# Patient Record
Sex: MALE | Race: WHITE | NOT HISPANIC OR LATINO | URBAN - METROPOLITAN AREA
[De-identification: names, ages, dates, MRNs, and addresses within clinical notes are randomized per-mention and may not be internally consistent; named-entity substitution may affect disease eponyms.]

---

## 2020-07-01 ENCOUNTER — OUT OF OFFICE VISIT (OUTPATIENT)
Dept: URBAN - METROPOLITAN AREA MEDICAL CENTER 1 | Facility: MEDICAL CENTER | Age: 35
End: 2020-07-01

## 2020-07-01 DIAGNOSIS — K52.89 (LYMPHOCYTIC) MICROSCOPIC COLITIS: ICD-10-CM

## 2020-07-01 DIAGNOSIS — K75.89 CHOLESTATIC HEPATITIS: ICD-10-CM

## 2020-07-01 DIAGNOSIS — K31.89 ACQUIRED DEFORMITY OF PYLORUS: ICD-10-CM

## 2020-07-01 DIAGNOSIS — K20.8 CORROSIVE ESOPHAGITIS: ICD-10-CM

## 2020-07-01 DIAGNOSIS — K92.0 BLOODY EMESIS: ICD-10-CM

## 2020-07-01 PROCEDURE — 99255 IP/OBS CONSLTJ NEW/EST HI 80: CPT | Performed by: INTERNAL MEDICINE

## 2020-07-01 PROCEDURE — G0500 MOD SEDAT ENDO SERVICE >5YRS: HCPCS | Performed by: INTERNAL MEDICINE

## 2020-07-01 PROCEDURE — 43239 EGD BIOPSY SINGLE/MULTIPLE: CPT | Performed by: INTERNAL MEDICINE

## 2020-07-06 ENCOUNTER — TELEPHONE ENCOUNTER (OUTPATIENT)
Dept: URBAN - METROPOLITAN AREA CLINIC 92 | Facility: CLINIC | Age: 35
End: 2020-07-06

## 2020-07-10 LAB
HEMATOCRIT: 38.2
HEMOGLOBIN: 12.5
MCH: 29.9
MCHC: 32.7
MCV: 91
NRBC: (no result)
PLATELETS: 376
QUANTIFERON CRITERIA: (no result)
QUANTIFERON INCUBATION: (no result)
QUANTIFERON MITOGEN VALUE: 0.03
QUANTIFERON NIL VALUE: 0.02
QUANTIFERON TB1 AG VALUE: 0.03
QUANTIFERON TB2 AG VALUE: 0.02
QUANTIFERON-TB GOLD PLUS: (no result)
RBC: 4.18
RDW: 13
WBC: 21.4

## 2020-07-21 ENCOUNTER — OFFICE VISIT (OUTPATIENT)
Dept: URBAN - NONMETROPOLITAN AREA CLINIC 2 | Facility: CLINIC | Age: 35
End: 2020-07-21

## 2020-08-06 ENCOUNTER — OFFICE VISIT (OUTPATIENT)
Dept: URBAN - METROPOLITAN AREA TELEHEALTH 2 | Facility: TELEHEALTH | Age: 35
End: 2020-08-06

## 2020-08-06 DIAGNOSIS — R10.32 LEFT LOWER QUADRANT ABDOMINAL PAIN: ICD-10-CM

## 2020-08-06 DIAGNOSIS — N17.9 AKI (ACUTE KIDNEY INJURY): ICD-10-CM

## 2020-08-06 DIAGNOSIS — K50.018 TERMINAL ILEITIS WITH OTHER COMPLICATION: ICD-10-CM

## 2020-08-06 DIAGNOSIS — K50.80 CROHN'S DISEASE OF BOTH SMALL AND LARGE INTESTINE: ICD-10-CM

## 2020-08-06 DIAGNOSIS — R74.0 ELEVATED TRANSAMINASE LEVEL: ICD-10-CM

## 2020-08-06 PROCEDURE — G9903 PT SCRN TBCO ID AS NON USER: HCPCS | Performed by: NURSE PRACTITIONER

## 2020-08-06 PROCEDURE — G8427 DOCREV CUR MEDS BY ELIG CLIN: HCPCS | Performed by: NURSE PRACTITIONER

## 2020-08-06 PROCEDURE — G8420 CALC BMI NORM PARAMETERS: HCPCS | Performed by: NURSE PRACTITIONER

## 2020-08-06 PROCEDURE — 99213 OFFICE O/P EST LOW 20 MIN: CPT | Performed by: NURSE PRACTITIONER

## 2020-08-06 PROCEDURE — 1036F TOBACCO NON-USER: CPT | Performed by: NURSE PRACTITIONER

## 2020-08-06 RX ORDER — METRONIDAZOLE 500 MG/1
1 TABLET TABLET, FILM COATED ORAL THREE TIMES A DAY
Qty: 30 TABLET | Refills: 0 | OUTPATIENT
Start: 2020-08-06 | End: 2020-08-16

## 2020-08-06 RX ORDER — CITALOPRAM HYDROBROMIDE 20 MG/1
1 TABLET TABLET, FILM COATED ORAL ONCE A DAY
Status: ACTIVE | COMMUNITY

## 2020-08-06 RX ORDER — LISINOPRIL 20 MG/1
1 TABLET TABLET ORAL ONCE A DAY
Status: ACTIVE | COMMUNITY

## 2020-08-06 RX ORDER — CIPROFLOXACIN HCL 500 MG
1 TABLET TABLET ORAL
Qty: 20 TABLET | Refills: 0 | OUTPATIENT
Start: 2020-08-06 | End: 2020-08-16

## 2020-08-06 RX ORDER — PREDNISONE 10 MG/1
1 TABLET TABLET ORAL ONCE A DAY
Status: ACTIVE | COMMUNITY

## 2020-08-06 RX ORDER — DICYCLOMINE HYDROCHLORIDE 10 MG/1
1 TABLET CAPSULE ORAL THREE TIMES A DAY
Qty: 270 TABLET | Refills: 3 | OUTPATIENT
Start: 2020-08-06 | End: 2021-08-01

## 2020-08-06 NOTE — HPI-TODAY'S VISIT:
Felipe is a  34 year old male who with suspected Crohns who presents for hospital follow up after admission in July to [facility] for severe abdominal pain, nausea, coffee ground emesis and bloody diarrhea.  He was noted to have significantly elevated transaminases  and ALT  greater than 500 with normal bili, alk phos, GGT, and elevated CK 1266. He had KACEY with creatinine of 3.34. CT abdomen and pelvis with terminal ileitis with air and fluid distention of the proximal small bowel. EGD and colonoscopy moderate esophagitis and gastritis, normal duodenum, patchy inflammation and ulceration throughout colon with, most active inflammtion in the ascending colon and rectum, and ulcerated polypoid "mass" in the TI. Gastric bx negative for H. pylori, esophageal bx negative for Barretts, colonic and TI bx c/w nonspecific ulceration and acute inflammation. C. diff was negative. Hepatitis and chronic liver serologies were negative. KACEY resolved with hydration and LFTs began trending down. He improved with IV steroids and antibiotics and was discharged to complete antibiotics, steroid taper, and follow up in clinic.  He missed his initial follow up appt. He currently does not have insurance due to furlough. Today he reports worsening abdominal pain and many days he is unable to get out of bed. This is a crampy LLQ pain that radiates to his rectum. He has nausea/vomiting when the pain is severe. He has had fever of 101 and 102, last was about 5 days ago. He has chills/rigors with this. He has arthalgias of his ankles and knees. Diarrhea has improved and he is not having 2-3 stools daily that are mushy. Urgency is improving. He continues with nocturnal awakening for BM 1-2 times a night. Denies bleeding. He has completed the antibiotics and has been taking Prednisone 40mg daily since his discharge on 7/4/20. CBC after d/c with WBC of 21, likely from steroids. His Prometheus panel was not c/w IBD and his TB quant gold is indeterminate. TG

## 2020-08-10 ENCOUNTER — TELEPHONE ENCOUNTER (OUTPATIENT)
Dept: URBAN - NONMETROPOLITAN AREA CLINIC 2 | Facility: CLINIC | Age: 35
End: 2020-08-10

## 2020-08-14 ENCOUNTER — OUT OF OFFICE VISIT (OUTPATIENT)
Dept: URBAN - METROPOLITAN AREA MEDICAL CENTER 1 | Facility: MEDICAL CENTER | Age: 35
End: 2020-08-14

## 2020-08-14 DIAGNOSIS — R93.3 ABN FINDINGS-GI TRACT: ICD-10-CM

## 2020-08-14 DIAGNOSIS — R10.31 ABDOMINAL DISCOMFORT IN RIGHT LOWER QUADRANT: ICD-10-CM

## 2020-08-14 DIAGNOSIS — N17.8 OTHER ACUTE KIDNEY FAILURE: ICD-10-CM

## 2020-08-14 DIAGNOSIS — E86.0 DEHYDRATION: ICD-10-CM

## 2020-08-14 PROCEDURE — 99254 IP/OBS CNSLTJ NEW/EST MOD 60: CPT | Performed by: INTERNAL MEDICINE

## 2020-08-14 PROCEDURE — 99232 SBSQ HOSP IP/OBS MODERATE 35: CPT | Performed by: INTERNAL MEDICINE

## 2020-08-14 PROCEDURE — G8427 DOCREV CUR MEDS BY ELIG CLIN: HCPCS | Performed by: INTERNAL MEDICINE

## 2020-08-14 PROCEDURE — 99222 1ST HOSP IP/OBS MODERATE 55: CPT | Performed by: INTERNAL MEDICINE

## 2020-08-15 ENCOUNTER — OUT OF OFFICE VISIT (OUTPATIENT)
Dept: URBAN - METROPOLITAN AREA MEDICAL CENTER 1 | Facility: MEDICAL CENTER | Age: 35
End: 2020-08-15

## 2020-08-15 DIAGNOSIS — N17.8 OTHER ACUTE KIDNEY FAILURE: ICD-10-CM

## 2020-08-15 DIAGNOSIS — R11.2 ACUTE NAUSEA WITH NONBILIOUS VOMITING: ICD-10-CM

## 2020-08-15 DIAGNOSIS — R10.84 ABDOMINAL CRAMPING, GENERALIZED: ICD-10-CM

## 2020-08-15 DIAGNOSIS — R93.3 ABN FINDINGS-GI TRACT: ICD-10-CM

## 2020-08-15 PROCEDURE — 99232 SBSQ HOSP IP/OBS MODERATE 35: CPT | Performed by: INTERNAL MEDICINE

## 2020-08-18 ENCOUNTER — OUT OF OFFICE VISIT (OUTPATIENT)
Dept: URBAN - METROPOLITAN AREA MEDICAL CENTER 1 | Facility: MEDICAL CENTER | Age: 35
End: 2020-08-18

## 2020-08-18 DIAGNOSIS — R11.2 ACUTE NAUSEA WITH NONBILIOUS VOMITING: ICD-10-CM

## 2020-08-18 DIAGNOSIS — R10.84 ABDOMINAL CRAMPING, GENERALIZED: ICD-10-CM

## 2020-08-18 DIAGNOSIS — R93.3 ABN FINDINGS-GI TRACT: ICD-10-CM

## 2020-08-18 DIAGNOSIS — N17.8 OTHER ACUTE KIDNEY FAILURE: ICD-10-CM

## 2020-08-18 PROCEDURE — 99232 SBSQ HOSP IP/OBS MODERATE 35: CPT | Performed by: INTERNAL MEDICINE

## 2020-10-09 ENCOUNTER — OFFICE VISIT (OUTPATIENT)
Dept: URBAN - NONMETROPOLITAN AREA CLINIC 2 | Facility: CLINIC | Age: 35
End: 2020-10-09

## 2020-10-09 DIAGNOSIS — K50.018 TERMINAL ILEITIS WITH OTHER COMPLICATION: ICD-10-CM

## 2020-10-09 DIAGNOSIS — Z93.2 ILEOSTOMY IN PLACE: ICD-10-CM

## 2020-10-09 DIAGNOSIS — K50.00 CROHN'S DISEASE OF SMALL INTESTINE WITHOUT COMPLICATION: ICD-10-CM

## 2020-10-09 PROCEDURE — 1036F TOBACCO NON-USER: CPT | Performed by: NURSE PRACTITIONER

## 2020-10-09 PROCEDURE — G8427 DOCREV CUR MEDS BY ELIG CLIN: HCPCS | Performed by: NURSE PRACTITIONER

## 2020-10-09 PROCEDURE — 99213 OFFICE O/P EST LOW 20 MIN: CPT | Performed by: NURSE PRACTITIONER

## 2020-10-09 PROCEDURE — G8420 CALC BMI NORM PARAMETERS: HCPCS | Performed by: NURSE PRACTITIONER

## 2020-10-09 PROCEDURE — G8484 FLU IMMUNIZE NO ADMIN: HCPCS | Performed by: NURSE PRACTITIONER

## 2020-10-09 RX ORDER — PREDNISONE 10 MG/1
1 TABLET TABLET ORAL ONCE A DAY
Status: ACTIVE | COMMUNITY

## 2020-10-09 RX ORDER — CITALOPRAM HYDROBROMIDE 20 MG/1
1 TABLET TABLET, FILM COATED ORAL ONCE A DAY
Status: ACTIVE | COMMUNITY

## 2020-10-09 RX ORDER — DICYCLOMINE HYDROCHLORIDE 10 MG/1
1 TABLET CAPSULE ORAL THREE TIMES A DAY
Qty: 270 TABLET | Refills: 3 | Status: ACTIVE | COMMUNITY
Start: 2020-08-06 | End: 2021-08-01

## 2020-10-09 RX ORDER — LISINOPRIL 20 MG/1
1 TABLET TABLET ORAL ONCE A DAY
Status: ACTIVE | COMMUNITY

## 2020-10-09 NOTE — HPI-TODAY'S VISIT:
8/6/20 Felipe is a  34 year old male who with suspected Crohns who presents for hospital follow up after admission in July to [facility] for severe abdominal pain, nausea, coffee ground emesis and bloody diarrhea.  He was noted to have significantly elevated transaminases  and ALT  greater than 500 with normal bili, alk phos, GGT, and elevated CK 1266. He had KACEY with creatinine of 3.34. CT abdomen and pelvis with terminal ileitis with air and fluid distention of the proximal small bowel. EGD and colonoscopy moderate esophagitis and gastritis, normal duodenum, patchy inflammation and ulceration throughout colon with, most active inflammtion in the ascending colon and rectum, and ulcerated polypoid "mass" in the TI. Gastric bx negative for H. pylori, esophageal bx negative for Barretts, colonic and TI bx c/w nonspecific ulceration and acute inflammation. C. diff was negative. Hepatitis and chronic liver serologies were negative. KACEY resolved with hydration and LFTs began trending down. He improved with IV steroids and antibiotics and was discharged to complete antibiotics, steroid taper, and follow up in clinic.  He missed his initial follow up appt. He currently does not have insurance due to furlough. Today he reports worsening abdominal pain and many days he is unable to get out of bed. This is a crampy LLQ pain that radiates to his rectum. He has nausea/vomiting when the pain is severe. He has had fever of 101 and 102, last was about 5 days ago. He has chills/rigors with this. He has arthalgias of his ankles and knees. Diarrhea has improved and he is not having 2-3 stools daily that are mushy. Urgency is improving. He continues with nocturnal awakening for BM 1-2 times a night. Denies bleeding. He has completed the antibiotics and has been taking Prednisone 40mg daily since his discharge on 7/4/20. CBC after d/c with WBC of 21, likely from steroids. His Prometheus panel was not c/w IBD and his TB quant gold is indeterminate. TG  10/9/20 Petros presents for follow up after complicated admission for presumed Crohns with terminal ileitis on imaging and was unresponsive to Remicade and prednisone, ultimately decided that he would need resection and underwent resection of TI, cecum, and ascending colon with Dr. Maher. Unfortunately developed post op leak of anastomosis and required further resection and ileostomy. He has not followed up with surgeon since his discharge. Dr. Pool discussed path and there were no findings c/w Crohns and concern this may be ischemia related to drug use. Patient does admit to methamphetamine use today. In the past he has reported intermittent aderall use but today reports he injects meth several times a week. Discussed cessation at length today. As above, he has not followed up with surgeon and has not scheduled reveral of ileostomy. He is having watery stools at expected. No significant abdominal pain. TG

## 2020-10-09 NOTE — PHYSICAL EXAM GASTROINTESTINAL
Abdomen  , soft, nontender, nondistended , no masses palpable , normal bowel sounds , no hepatomegaly present  ostomy present

## 2020-11-20 ENCOUNTER — OFFICE VISIT (OUTPATIENT)
Dept: URBAN - NONMETROPOLITAN AREA CLINIC 2 | Facility: CLINIC | Age: 35
End: 2020-11-20

## 2020-11-20 RX ORDER — PREDNISONE 10 MG/1
1 TABLET TABLET ORAL ONCE A DAY
Status: ACTIVE | COMMUNITY

## 2020-11-20 RX ORDER — CITALOPRAM HYDROBROMIDE 20 MG/1
1 TABLET TABLET, FILM COATED ORAL ONCE A DAY
Status: ACTIVE | COMMUNITY

## 2020-11-20 RX ORDER — LISINOPRIL 20 MG/1
1 TABLET TABLET ORAL ONCE A DAY
Status: ACTIVE | COMMUNITY

## 2020-11-20 RX ORDER — DICYCLOMINE HYDROCHLORIDE 10 MG/1
1 TABLET CAPSULE ORAL THREE TIMES A DAY
Qty: 270 TABLET | Refills: 3 | Status: ACTIVE | COMMUNITY
Start: 2020-08-06 | End: 2021-08-01

## 2021-02-17 ENCOUNTER — OFFICE VISIT (OUTPATIENT)
Dept: URBAN - METROPOLITAN AREA TELEHEALTH 2 | Facility: TELEHEALTH | Age: 36
End: 2021-02-17

## 2021-02-17 ENCOUNTER — LAB OUTSIDE AN ENCOUNTER (OUTPATIENT)
Dept: URBAN - METROPOLITAN AREA TELEHEALTH 2 | Facility: TELEHEALTH | Age: 36
End: 2021-02-17

## 2021-02-17 DIAGNOSIS — K50.018 TERMINAL ILEITIS WITH OTHER COMPLICATION: ICD-10-CM

## 2021-02-17 DIAGNOSIS — R63.0 LOSS OF APPETITE: ICD-10-CM

## 2021-02-17 DIAGNOSIS — Z93.2 ILEOSTOMY IN PLACE: ICD-10-CM

## 2021-02-17 DIAGNOSIS — R11.2 INTRACTABLE VOMITING WITH NAUSEA, UNSPECIFIED VOMITING TYPE: ICD-10-CM

## 2021-02-17 PROCEDURE — G8420 CALC BMI NORM PARAMETERS: HCPCS | Performed by: NURSE PRACTITIONER

## 2021-02-17 PROCEDURE — G8427 DOCREV CUR MEDS BY ELIG CLIN: HCPCS | Performed by: NURSE PRACTITIONER

## 2021-02-17 PROCEDURE — G9902 PT SCRN TBCO AND ID AS USER: HCPCS | Performed by: NURSE PRACTITIONER

## 2021-02-17 PROCEDURE — 4004F PT TOBACCO SCREEN RCVD TLK: CPT | Performed by: NURSE PRACTITIONER

## 2021-02-17 PROCEDURE — G8482 FLU IMMUNIZE ORDER/ADMIN: HCPCS | Performed by: NURSE PRACTITIONER

## 2021-02-17 PROCEDURE — G9906 PT RECV TBCO CESS INTERV: HCPCS | Performed by: NURSE PRACTITIONER

## 2021-02-17 PROCEDURE — 99214 OFFICE O/P EST MOD 30 MIN: CPT | Performed by: NURSE PRACTITIONER

## 2021-02-17 RX ORDER — ONDANSETRON 4 MG/1
1 TABLET ON THE TONGUE AND ALLOW TO DISSOLVE TABLET, ORALLY DISINTEGRATING ORAL BID
Qty: 30 | Refills: 2 | OUTPATIENT
Start: 2021-02-17

## 2021-02-17 RX ORDER — PANTOPRAZOLE SODIUM 40 MG/1
1 TABLET TABLET, DELAYED RELEASE ORAL BID
Qty: 60 TABLET | Refills: 3 | OUTPATIENT
Start: 2021-02-17

## 2021-02-17 RX ORDER — CITALOPRAM HYDROBROMIDE 20 MG/1
1 TABLET TABLET, FILM COATED ORAL ONCE A DAY
Status: ACTIVE | COMMUNITY

## 2021-02-17 RX ORDER — DICYCLOMINE HYDROCHLORIDE 10 MG/1
1 TABLET CAPSULE ORAL THREE TIMES A DAY
Qty: 270 TABLET | Refills: 3 | Status: ON HOLD | COMMUNITY
Start: 2020-08-06 | End: 2021-08-01

## 2021-02-17 RX ORDER — METRONIDAZOLE 250 MG/1
1 TABLET TABLET ORAL THREE TIMES A DAY
Qty: 30 | OUTPATIENT
Start: 2021-02-17 | End: 2021-02-27

## 2021-02-17 RX ORDER — LISINOPRIL 20 MG/1
1 TABLET TABLET ORAL ONCE A DAY
Status: ACTIVE | COMMUNITY

## 2021-02-17 NOTE — HPI-TODAY'S VISIT:
8/6/20 Felipe is a  34 year old male who with suspected Crohns who presents for hospital follow up after admission in July to [facility] for severe abdominal pain, nausea, coffee ground emesis and bloody diarrhea.  He was noted to have significantly elevated transaminases  and ALT  greater than 500 with normal bili, alk phos, GGT, and elevated CK 1266. He had KACEY with creatinine of 3.34. CT abdomen and pelvis with terminal ileitis with air and fluid distention of the proximal small bowel. EGD and colonoscopy moderate esophagitis and gastritis, normal duodenum, patchy inflammation and ulceration throughout colon with, most active inflammtion in the ascending colon and rectum, and ulcerated polypoid "mass" in the TI. Gastric bx negative for H. pylori, esophageal bx negative for Barretts, colonic and TI bx c/w nonspecific ulceration and acute inflammation. C. diff was negative. Hepatitis and chronic liver serologies were negative. KACEY resolved with hydration and LFTs began trending down. He improved with IV steroids and antibiotics and was discharged to complete antibiotics, steroid taper, and follow up in clinic.  He missed his initial follow up appt. He currently does not have insurance due to furlough. Today he reports worsening abdominal pain and many days he is unable to get out of bed. This is a crampy LLQ pain that radiates to his rectum. He has nausea/vomiting when the pain is severe. He has had fever of 101 and 102, last was about 5 days ago. He has chills/rigors with this. He has arthalgias of his ankles and knees. Diarrhea has improved and he is not having 2-3 stools daily that are mushy. Urgency is improving. He continues with nocturnal awakening for BM 1-2 times a night. Denies bleeding. He has completed the antibiotics and has been taking Prednisone 40mg daily since his discharge on 7/4/20. CBC after d/c with WBC of 21, likely from steroids. His Prometheus panel was not c/w IBD and his TB quant gold is indeterminate. TG  10/9/20 Petros presents for follow up after complicated admission for presumed Crohns with terminal ileitis on imaging and was unresponsive to Remicade and prednisone, ultimately decided that he would need resection and underwent resection of TI, cecum, and ascending colon with Dr. Maher. Unfortunately developed post op leak of anastomosis and required further resection and ileostomy. He has not followed up with surgeon since his discharge. Dr. Pool discussed path and there were no findings c/w Crohns and concern this may be ischemia related to drug use. Patient does admit to methamphetamine use today. In the past he has reported intermittent aderall use but today reports he injects meth several times a week. Discussed cessation at length today. As above, he has not followed up with surgeon and has not scheduled reveral of ileostomy. He is having watery stools at expected. No significant abdominal pain. TG  2/17/21 Petros presents with complaints of abdominal pain, nausea, and vomiting today. He no showed for his follow up with Dr. Pool in November. He reports he has quit using IV meth since his last visit. He did follow up with Dr. Maher and had ileostomy reversal 12/8/20. Initially he reports he did well with this but then tells me he developed intrabadominal abscess following surgery and ultimately had to have a drain placed. He completed the antibiotics about 2 weeks ago. He was discharged 12/29/20 or 12/30/20. Will need to review the admissio records.   He reports hew as doing well at home--tolerating diet with no nausea/vomiting or abdominal pain until he helped his sister move about a week ago. He thinks he picked up something heavy and "caused a problem with his surgery site". Since that time he has persistent soreness/tenderness at the site of his prio ileostomy and has not tolerated po intake. He can sip on liquids but will vomit if he pushes liquids too quickly or if he tries to eat solids. His abdomen is soft. He has no drainage from his prior incision. He does not feel any "lumps". He is having 1-2 formed stools daily with no bleeding and no incontinence. He does have bloating and flatulence. Again, he reports cessation of IV meth but is drinking etoh a few days a week but has vomiting with this as well and has not drank etoh in several days. No fever/chills. His only medications are lisinopril and celexa.

## 2021-02-17 NOTE — PHYSICAL EXAM GASTROINTESTINAL
Self Exam: Abdomen soft, tenderness when he palpates the left abdomen, non-distended, surgical scar noted.

## 2021-09-09 ENCOUNTER — TELEPHONE ENCOUNTER (OUTPATIENT)
Dept: URBAN - NONMETROPOLITAN AREA CLINIC 2 | Facility: CLINIC | Age: 36
End: 2021-09-09

## 2021-10-08 ENCOUNTER — WEB ENCOUNTER (OUTPATIENT)
Dept: URBAN - NONMETROPOLITAN AREA CLINIC 2 | Facility: CLINIC | Age: 36
End: 2021-10-08

## 2021-10-18 ENCOUNTER — OFFICE VISIT (OUTPATIENT)
Dept: URBAN - NONMETROPOLITAN AREA CLINIC 2 | Facility: CLINIC | Age: 36
End: 2021-10-18

## 2021-11-16 ENCOUNTER — LAB OUTSIDE AN ENCOUNTER (OUTPATIENT)
Dept: URBAN - NONMETROPOLITAN AREA CLINIC 2 | Facility: CLINIC | Age: 36
End: 2021-11-16

## 2021-11-16 ENCOUNTER — TELEPHONE ENCOUNTER (OUTPATIENT)
Dept: URBAN - METROPOLITAN AREA CLINIC 92 | Facility: CLINIC | Age: 36
End: 2021-11-16

## 2021-11-16 ENCOUNTER — WEB ENCOUNTER (OUTPATIENT)
Dept: URBAN - NONMETROPOLITAN AREA CLINIC 2 | Facility: CLINIC | Age: 36
End: 2021-11-16

## 2021-11-16 ENCOUNTER — OFFICE VISIT (OUTPATIENT)
Dept: URBAN - NONMETROPOLITAN AREA CLINIC 2 | Facility: CLINIC | Age: 36
End: 2021-11-16

## 2021-11-16 ENCOUNTER — DASHBOARD ENCOUNTERS (OUTPATIENT)
Age: 36
End: 2021-11-16

## 2021-11-16 DIAGNOSIS — D64.89 OTHER SPECIFIED ANEMIAS: ICD-10-CM

## 2021-11-16 DIAGNOSIS — R11.2 INTRACTABLE VOMITING WITH NAUSEA, UNSPECIFIED VOMITING TYPE: ICD-10-CM

## 2021-11-16 DIAGNOSIS — R93.5 ABNORMAL CT OF THE ABDOMEN: ICD-10-CM

## 2021-11-16 DIAGNOSIS — K50.018 TERMINAL ILEITIS WITH OTHER COMPLICATION: ICD-10-CM

## 2021-11-16 PROBLEM — 302111002: Status: ACTIVE | Noted: 2021-02-17

## 2021-11-16 PROBLEM — 79890006: Status: ACTIVE | Noted: 2021-02-17

## 2021-11-16 PROBLEM — 266435005: Status: ACTIVE | Noted: 2021-11-16

## 2021-11-16 PROBLEM — 56689002 CROHN'S DISEASE OF SMALL INTESTINE: Status: ACTIVE | Noted: 2021-11-16

## 2021-11-16 PROCEDURE — 99215 OFFICE O/P EST HI 40 MIN: CPT | Performed by: INTERNAL MEDICINE

## 2021-11-16 RX ORDER — PANTOPRAZOLE SODIUM 40 MG/1
1 TABLET TABLET, DELAYED RELEASE ORAL BID
Qty: 60 TABLET | Refills: 3 | Status: DISCONTINUED | COMMUNITY
Start: 2021-02-17

## 2021-11-16 RX ORDER — CITALOPRAM HYDROBROMIDE 20 MG/1
1 TABLET TABLET, FILM COATED ORAL ONCE A DAY
Status: ACTIVE | COMMUNITY

## 2021-11-16 RX ORDER — LISINOPRIL 20 MG/1
1 TABLET TABLET ORAL ONCE A DAY
Status: ACTIVE | COMMUNITY

## 2021-11-16 RX ORDER — OMEPRAZOLE 40 MG/1
1 CAPSULE 30 MINUTES BEFORE MORNING MEAL CAPSULE, DELAYED RELEASE PELLETS ORAL ONCE A DAY
Qty: 90 CAPSULES | Refills: 3 | OUTPATIENT
Start: 2021-11-16

## 2021-11-16 RX ORDER — ONDANSETRON 4 MG/1
1 TABLET ON THE TONGUE AND ALLOW TO DISSOLVE TABLET, ORALLY DISINTEGRATING ORAL BID
Qty: 30 | Refills: 2 | Status: DISCONTINUED | COMMUNITY
Start: 2021-02-17

## 2021-11-16 NOTE — HPI-TODAY'S VISIT:
11/16/2021 Petros Eisenberg is a 36-year-old male who presents for follow-up with complaints of abdominal pain, nausea and vomiting, and recent ER visit with abnormal CT of the abdomen.  This is his first visit since February 2021 that was telehealth. He was admitted July 2020 to [facility] and found to have severe terminal ileitis involving 15 cm of small bowel.  EGD and colonoscopy revealed moderate esophagitis and gastritis with patchy inflammation and ulceration throughout the colon and an ulcerated polypoid mass in the TI.  Biopsies were consistent with colonic ischemia and TI histology consistent with ulceration and acute inflammatory changes.  He was presumed to have Crohn's and treated with steroids antibiotics and ultimately had a dose of Remicade.  He had no significant improvement and underwent resection of TI, cecum, and ascending colon with Dr. Maher 8/2020.  Pathology was not suggestive of Crohn's and there was concern for ischemia related to drug abuse and patient later admitted to IV methamphetamine use.  IBD serologies were not consistent with IBD.  He did have his ileostomy reversed 12/2020 and did well for a while. Today, he reports he began with more symptoms including abdominal pain, nausea, vomiting, and diarrhea in June that have slowly progressed in severity.  He had severe abdominal pain while at work in September and went to the ER.  I reviewed the encounter in epic.  CT abdomen and pelvis with contrast consistent with his prior ileostomy with reversal, fluid in small bowel and colon consistent with infectious versus inflammatory enterocolitis, and esophagitis as well as possible cystitis with bladder wall thickening.  Report states that the liver appears normal and there is no bile duct dilation.  Labs revealed mild anemia with hemoglobin of 12.1, BUN 26, creatinine 1.36 today he reports that he has stopped but otherwise unremarkable.  He was discharged with cefdinir and antiemetics. Today, he reports he was also told he had an abdominal mass that was concerning for malignancy.  There is no mention of this on the report.  The ER physician documentation does state a lobulated soft tissue mass inferior to the tip of the liver. Overall, he continues with some abdominal pain, intermittent nausea and vomiting, intermittent loose stools with rectal bleeding.  His reflux is controlled on omeprazole once daily.  He had to take a leave of absence from work after his ER visit in September and reports since he has not returned to work his symptoms seem to have improved.  He is not sure if this is related to stress or perhaps the decreased activity/physical labor but is feeling a bit better. He denies IV meth use at this time.  History regarding his last use is not quite clear but he thinks it has been a couple of months.--At least 2 or 3.  History is not quite consistent today and he does appear quite jittery.  Unclear how reliable this is.  TG

## 2021-11-17 LAB
HEMATOCRIT: 38
HEMOGLOBIN: 12.4
MCH: 28.9
MCHC: 32.6
MCV: 89
NRBC: (no result)
PLATELETS: 285
RBC: 4.29
RDW: 12.5
WBC: 5.8

## 2022-01-11 ENCOUNTER — OFFICE VISIT (OUTPATIENT)
Dept: URBAN - NONMETROPOLITAN AREA SURGERY CENTER 1 | Facility: SURGERY CENTER | Age: 37
End: 2022-01-11

## 2022-02-03 ENCOUNTER — OFFICE VISIT (OUTPATIENT)
Dept: URBAN - NONMETROPOLITAN AREA CLINIC 13 | Facility: CLINIC | Age: 37
End: 2022-02-03

## 2022-02-08 PROBLEM — 56689002 CROHN'S DISEASE OF SMALL INTESTINE: Status: ACTIVE | Noted: 2021-11-16

## 2022-02-09 ENCOUNTER — OFFICE VISIT (OUTPATIENT)
Dept: URBAN - NONMETROPOLITAN AREA SURGERY CENTER 1 | Facility: SURGERY CENTER | Age: 37
End: 2022-02-09

## 2022-03-17 ENCOUNTER — OFFICE VISIT (OUTPATIENT)
Dept: URBAN - NONMETROPOLITAN AREA CLINIC 13 | Facility: CLINIC | Age: 37
End: 2022-03-17

## 2022-03-17 RX ORDER — LISINOPRIL 20 MG/1
1 TABLET TABLET ORAL ONCE A DAY
Status: ACTIVE | COMMUNITY

## 2022-03-17 RX ORDER — CITALOPRAM HYDROBROMIDE 20 MG/1
1 TABLET TABLET, FILM COATED ORAL ONCE A DAY
Status: ACTIVE | COMMUNITY

## 2022-03-17 RX ORDER — OMEPRAZOLE 40 MG/1
1 CAPSULE 30 MINUTES BEFORE MORNING MEAL CAPSULE, DELAYED RELEASE PELLETS ORAL ONCE A DAY
Qty: 90 CAPSULES | Refills: 3 | Status: ACTIVE | COMMUNITY
Start: 2021-11-16

## 2022-11-01 NOTE — PHYSICAL EXAM EYES:
Conjuntivae and eyelids appear normal,  Sclerae : White without injection Rx: MVI and vit C 500mg daily.